# Patient Record
Sex: FEMALE | Race: OTHER
[De-identification: names, ages, dates, MRNs, and addresses within clinical notes are randomized per-mention and may not be internally consistent; named-entity substitution may affect disease eponyms.]

---

## 2020-04-15 ENCOUNTER — HOSPITAL ENCOUNTER (EMERGENCY)
Dept: HOSPITAL 72 - EMR | Age: 45
Discharge: HOME | End: 2020-04-15
Payer: COMMERCIAL

## 2020-04-15 VITALS — SYSTOLIC BLOOD PRESSURE: 117 MMHG | DIASTOLIC BLOOD PRESSURE: 80 MMHG

## 2020-04-15 VITALS — DIASTOLIC BLOOD PRESSURE: 80 MMHG | SYSTOLIC BLOOD PRESSURE: 117 MMHG

## 2020-04-15 VITALS — BODY MASS INDEX: 26.66 KG/M2 | HEIGHT: 65 IN | WEIGHT: 160 LBS

## 2020-04-15 DIAGNOSIS — S80.01XA: Primary | ICD-10-CM
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
## 2020-04-15 PROCEDURE — 99283 EMERGENCY DEPT VISIT LOW MDM: CPT

## 2020-04-15 NOTE — EMERGENCY ROOM REPORT
History of Present Illness


General


Chief Complaint:  Multiple Trauma/Fall


Source:  Patient





Present Illness


HPI


Disclaimer: Please note that this report is being documented using [employer]AG[employer] 

technology. This can lead to erroneous entry secondary to incorrect 

interpretation by the dictating instrument.





HPI: 44-year-old female presents for evaluation of right knee pain.  Patient 

slipped on wet floor while at work falling onto her right knee complaining of 

immediate pain.  No significant swelling.  She had difficulty bearing weight on 

the knee but this is improving.  She took Tylenol prior to arrival.  Pain does 

not radiate.  Denies instability in the joint.  Denies injury to the hip, ankle 

or other body part.  The left buttock is somewhat sore.  Denies numbness or 

tingling in the lower extremities.  No prior history of injury or surgical 

intervention


Allergies:  


Coded Allergies:  


     No Known Allergies (Unverified , 4/15/20)





COVID-19 Screening


Contact w/high risk pt:  No


Recent Travel to affected area:  No


Experienced COVID-19 symptoms?:  No





Patient History


Last Menstrual Period:  na





Nursing Documentation-[facility]


Past Medical History:  No Stated History





Review of Systems


All Other Systems:  negative except mentioned in HPI





Physical Exam





Vital Signs








  Date Time  Temp Pulse Resp B/P (MAP) Pulse Ox O2 Delivery O2 Flow Rate FiO2


 


4/15/20 13:38 99.3 97 16 117/80 (92) 98 Room Air  





 





General: Awake and alert, no acute distress


HEENT: NC/AT. EOMI. 


Resp: Normal work of breathing


Skin: Intact.  No abrasions, laceration or rash over the exposed skin


MSK: Normal tone and bulk. Moving all extremities.  No obvious deformity.  

Patella in anatomic position.  No significant swelling.  There is tender to 

palpation over the medial aspect of the right knee without deformity.  No 

laxity on varus or valgus testing.  No instability on Lockman test.  Able to 

bear weight.  Ambulating with a limp.


Neuro: Awake and alert.  Mentating appropriately


Spine: There is no tenderness, step-off or deformity in the cervical, thoracic 

or lumbosacral spine.  No significant paraspinal tenderness.





Medical Decision Making


Diagnostic Impression:  


 Primary Impression:  


 Knee contusion


ER Course


Is a 44-year-old female presenting for evaluation of knee pain after a fall.  

Concern for fracture or ligamentous injury to the knee.  X-ray obtained.  No 

obvious fracture dislocation noted. Patient was also complaining of some right-

sided back pain though her pain is more centered around the right gluteus.  No 

instability or significant pain in the hip to suggest fracture or dislocation.  

Likely a soft tissue injury; no indication for imaging at this time.  Continue 

NSAIDs.  Will give knee brace and a cane for stability as she opted against 

crutches.  Referred to orthopedics and PMD.  Discussed reasons to return to the 

emergency department.  She understands and agrees with this treatment plan.


Other X-Ray Diagnostic Results


Other X-Ray Diagnostic Results :  


   X-Ray ordered:  Right knee


   # of Views/Limited Vs Complete:  3 View


   Indication:  Pain


   EP Interpretation:  Yes


   Interpretation:  no dislocation, no soft tissue swelling, no fractures


   Impression:  No acute disease


   Electronically Signed by:  Electronically signed by Dr. Anibal Adam





Last Vital Signs








  Date Time  Temp Pulse Resp B/P (MAP) Pulse Ox O2 Delivery O2 Flow Rate FiO2


 


4/15/20 13:38 99.3 97 16 117/80 (92) 98 Room Air  








Disposition:  HOME, SELF-CARE


Condition:  Stable


Scripts


Ibuprofen* (MOTRIN*) 600 Mg Tablet


600 MG ORAL Q6H PRN for For Pain, #30 TAB 0 Refills


   Prov: Anibal Adam MD         4/15/20











Anibal Adam MD Apr 15, 2020 14:09

## 2020-04-15 NOTE — DIAGNOSTIC IMAGING REPORT
Indication: Knee pain

 

Technique: 3 views of the right knee

 

Comparison: None

 

Findings: Bone mineralization within normal limits. No acute fractures identified.

Alignment is maintained. There is mild medial compartment joint space narrowing. No

suprapatellar joint effusion. No radiopaque foreign body.

 

Impression: No acute osseous abnormality.

## 2020-04-15 NOTE — NUR
-------------------------------------------------------------------------------

          *** Note tejasone in EDM - 04/15/20 at 1516 by MESHA ***           

-------------------------------------------------------------------------------

ED Nurse Note:



Pt wheeled into ED with wheelchair d/t s



Pt wheeled into the ED with W/C for S/P fall that happened at her work today at 
1150. pt slipped on a water spill.  pt reports pain to right knee and lower 
back.

## 2020-04-15 NOTE — NUR
ER DISCHARGE NOTE:



Patient is cleared to be discharged per ERMD, pt is aox4, on room air, with 
stable vital signs. pt was given dc and prescription instructions, pt was able 
to verbalize understanding, pt id band removed. pt took all belongings.

## 2020-04-15 NOTE — NUR
ED Nurse Note:



Pt wheeled into ED with wheelchair d/t s/p fall today at 1150. Pt is AOx4, calm 
and cooperative. Per triage, pt slipped on a water spill; pt complains of pain 
on RT knee and lower back. Pt's VSS, on RA, afebrile on traige. Will continue 
to monitor.